# Patient Record
Sex: MALE | URBAN - METROPOLITAN AREA
[De-identification: names, ages, dates, MRNs, and addresses within clinical notes are randomized per-mention and may not be internally consistent; named-entity substitution may affect disease eponyms.]

---

## 2019-07-10 ENCOUNTER — OFFICE VISIT (OUTPATIENT)
Dept: URBAN - METROPOLITAN AREA CLINIC 48 | Facility: CLINIC | Age: 49
End: 2019-07-10
Payer: COMMERCIAL

## 2019-07-10 DIAGNOSIS — H00.15 CHALAZION LEFT LOWER EYELID: ICD-10-CM

## 2019-07-10 PROCEDURE — 92004 COMPRE OPH EXAM NEW PT 1/>: CPT | Performed by: OPTOMETRIST

## 2019-07-10 ASSESSMENT — INTRAOCULAR PRESSURE
OS: 20
OD: 20

## 2019-07-10 NOTE — IMPRESSION/PLAN
Impression: Type 2 diabetes mellitus w/o complication: D97.9. Plan: No retinopathy found on today's examination. Discussed importance of blood sugar, blood pressure and cholesterol control. Letter with today's examination results sent to managing provider.  RTC 1 year for [facility]

## 2019-07-10 NOTE — IMPRESSION/PLAN
Impression: Chalazion left lower eyelid: H00.15. Plan: Recommend lid scrubs, hot compresses. RTC PRN if does not improve.

## 2022-02-22 ENCOUNTER — OFFICE VISIT (OUTPATIENT)
Dept: URBAN - METROPOLITAN AREA CLINIC 46 | Facility: CLINIC | Age: 52
End: 2022-02-22
Payer: COMMERCIAL

## 2022-02-22 DIAGNOSIS — H25.13 AGE-RELATED NUCLEAR CATARACT, BILATERAL: ICD-10-CM

## 2022-02-22 DIAGNOSIS — E11.9 DIABETES MELLITUS TYPE 2 WITHOUT MENTION OF COMPLICATION: Primary | ICD-10-CM

## 2022-02-22 PROCEDURE — 99204 OFFICE O/P NEW MOD 45 MIN: CPT | Performed by: OPHTHALMOLOGY

## 2022-02-22 ASSESSMENT — INTRAOCULAR PRESSURE
OS: 16
OD: 16

## 2022-02-22 NOTE — IMPRESSION/PLAN
Impression: Age-related nuclear cataract, bilateral: H25.13. Plan: No diabetic retinopathy is noted. Patient is advised that a yearly ophthalmic exam is recommended. Return sooner if any new symptoms.